# Patient Record
Sex: MALE | Race: WHITE | NOT HISPANIC OR LATINO | Employment: OTHER
[De-identification: names, ages, dates, MRNs, and addresses within clinical notes are randomized per-mention and may not be internally consistent; named-entity substitution may affect disease eponyms.]

---

## 2023-10-09 ENCOUNTER — TRANSCRIBE ORDERS (OUTPATIENT)
Dept: SCHEDULING | Age: 73
End: 2023-10-09

## 2023-10-09 DIAGNOSIS — M48.061 SPINAL STENOSIS, LUMBAR REGION WITHOUT NEUROGENIC CLAUDICATION: Primary | ICD-10-CM

## 2023-10-10 ENCOUNTER — HOSPITAL ENCOUNTER (OUTPATIENT)
Dept: RADIOLOGY | Facility: HOSPITAL | Age: 73
Discharge: HOME | End: 2023-10-10
Attending: PHYSICAL MEDICINE & REHABILITATION
Payer: MEDICARE

## 2023-10-10 DIAGNOSIS — M48.061 SPINAL STENOSIS, LUMBAR REGION WITHOUT NEUROGENIC CLAUDICATION: ICD-10-CM

## 2023-10-10 PROCEDURE — 72148 MRI LUMBAR SPINE W/O DYE: CPT

## 2023-10-11 ENCOUNTER — TELEPHONE (OUTPATIENT)
Dept: SURGERY | Facility: CLINIC | Age: 73
End: 2023-10-11
Payer: MEDICARE

## 2023-10-11 NOTE — TELEPHONE ENCOUNTER
"1. Who referred you to our office? Dr. Steve Ash    2.  Have you had spine surgery, including a spinal cord stimulator? No  No- Go to question 3.    Yes-  Did you have the surgery with Dr. Richardson?  Yes-   Go to question 3.    No- Because you've had prior surgery, the practice requires you to drop off, or mail, a recent (post-surgery) MRI or CT scan. As a courtesy, Dr. Richardson will review the imaging study to determine if he is able to offer surgical options. If Dr. Richardson does not feel there is a surgical solution he can offer, his nurse will call you with that information, and you won't need to come into the office. However, I am going to ask you a few more questions to get a better idea of how we can help you.  Go to question 3.    3. Is your pain related to a worker's compensation injury or a recent (within 2 years) auto accident?No  No- Go to question 4.    Yes- Unfortunately, Dr. Richardson does not accept these insurances  (No additional questions, please forward the encounter to [facility])         4.    Is your pain related to a spinal Fracture?No        No- Go to question 5.      Yes- Dr Richardson does not manage spinal fracture. We recommend following up with   Primary care provider or Pain management.        Where is your pain located (neck (cervical), mid (thoracic), or low back (lumbar))?Lumbar  If the patient has multiple areas of pain, explain I will make a note of this in your chart. However, Dr. Richardson will be able to focus on only ONE area at your appointment.   Go to question 5.    5. Have you completed an MRI or CT scan of your spine within the last 18 months? Yes    Yes- \"please bring the discs and reports of these images to your appointment.\"       Schedule with Dr. Richardson   Go to question 6.     No- \"I can schedule you an appointment with Dr. Dylan Joel's physician assistant.  She evaluates patients who do not have MRIs or CTs to help start a treatment plan. If " "after your initial visit with Marycruz, it is appropriate to see Dr. Richardson, we will schedule accordingly.\"      Schedule with Marycruz (make appointment length 60 minutes)   Go to question 7.     6. Have you completed x-rays of your spine within the last year?No    Yes- please bring the discs and reports of these images to your appointment.    Go to question 7.    No- We will order an x-ray for you, to complete prior to your visit. Are you able to complete the x-rays at a [facility] facility?     If patient cannot go to [facility], find out where they will be going so order can be faxed.    Remind patient to bring the disc of their images, and written report, to their appointment.   Schedule appointment 7-10 days out to give patient time to complete x-rays.   Go to question 7.    7.  Does your pain radiate? Yes  i. Go to question 8.       8. Do you have numbness or tingling? Yes    Go to question 9.    9. Do you have weakness?Yes  i. Go to question 10.     10. Have you completed physical therapy? Yes  i. Go to question 11.    11. Have you completed spinal injections? Yes      Please forward Telephone Encounter to [facility] Provider Pool.    Before disconnecting with the patient, inform them  Dr. Richardson cannot prescribe narcotic pain medication or perform epidural injections. But he can refer you to an injection specialist if that is determined to be the best treatment for you.    Instruct all patients to arrive 20 minutes prior to appointment for Check In.    If you do not schedule a patient for an appointment,   please give a reason for not scheduling!!    "

## 2023-10-13 ENCOUNTER — TELEPHONE (OUTPATIENT)
Dept: SURGERY | Facility: CLINIC | Age: 73
End: 2023-10-13
Payer: MEDICARE

## 2023-10-13 NOTE — TELEPHONE ENCOUNTER
Spoke with patient - he was under the impression that he needed to complete an x-ray prior to his appointment. Since he has an MRI, no xray is needed prior to his appointment. If Dr. Richardson wants him to get an x-ray after his appointment for some reason, he will discuss it at the patient's visit.  Moved patient's appt from 10/23 to 10/16 as patient is having severe leg pain.

## 2023-10-16 ENCOUNTER — OFFICE VISIT (OUTPATIENT)
Dept: SURGERY | Facility: CLINIC | Age: 73
End: 2023-10-16
Payer: MEDICARE

## 2023-10-16 VITALS
TEMPERATURE: 97.8 F | BODY MASS INDEX: 35.89 KG/M2 | OXYGEN SATURATION: 98 % | DIASTOLIC BLOOD PRESSURE: 90 MMHG | RESPIRATION RATE: 14 BRPM | HEART RATE: 91 BPM | WEIGHT: 265 LBS | HEIGHT: 72 IN | SYSTOLIC BLOOD PRESSURE: 152 MMHG

## 2023-10-16 DIAGNOSIS — M54.16 LUMBAR RADICULOPATHY: Primary | ICD-10-CM

## 2023-10-16 PROCEDURE — 99204 OFFICE O/P NEW MOD 45 MIN: CPT | Performed by: ORTHOPAEDIC SURGERY

## 2023-10-16 RX ORDER — SIMVASTATIN 10 MG/1
TABLET, FILM COATED ORAL
COMMUNITY
Start: 2023-10-01

## 2023-10-16 RX ORDER — LEVOTHYROXINE SODIUM 125 UG/1
1 TABLET ORAL DAILY
COMMUNITY
Start: 2000-01-01

## 2023-10-16 RX ORDER — TRAMADOL HYDROCHLORIDE 50 MG/1
TABLET ORAL
COMMUNITY
Start: 2023-10-11

## 2023-10-16 RX ORDER — METHYLPREDNISOLONE 4 MG/1
TABLET ORAL
Qty: 21 TABLET | Refills: 0 | Status: SHIPPED | OUTPATIENT
Start: 2023-10-16

## 2023-10-16 RX ORDER — TESTOSTERONE ENANTHATE 200 MG/ML
VIAL (ML) INTRAMUSCULAR
COMMUNITY
Start: 2014-01-01

## 2023-10-16 RX ORDER — SEMAGLUTIDE 1.34 MG/ML
INJECTION, SOLUTION SUBCUTANEOUS
COMMUNITY

## 2023-10-16 RX ORDER — CYCLOBENZAPRINE HCL 5 MG
1 TABLET ORAL 3 TIMES DAILY PRN
COMMUNITY

## 2023-10-16 RX ORDER — ARGININE HCL 500 MG
CAPSULE ORAL
COMMUNITY
Start: 2023-07-01

## 2023-10-16 RX ORDER — TADALAFIL 2.5 MG/1
5 TABLET ORAL DAILY
COMMUNITY

## 2023-10-16 RX ORDER — LIOTHYRONINE SODIUM 5 UG/1
5 TABLET ORAL
COMMUNITY

## 2023-10-16 RX ORDER — ANASTROZOLE 1 MG/1
TABLET ORAL
COMMUNITY
Start: 2023-08-03

## 2023-10-16 RX ORDER — TAMSULOSIN HYDROCHLORIDE 0.4 MG/1
CAPSULE ORAL
COMMUNITY
Start: 2023-08-22

## 2023-10-16 RX ORDER — SILDENAFIL 50 MG/1
50 TABLET, FILM COATED ORAL DAILY
COMMUNITY
Start: 2023-09-09

## 2023-10-16 RX ORDER — HYDROCODONE BITARTRATE AND ACETAMINOPHEN 5; 325 MG/1; MG/1
TABLET ORAL
COMMUNITY

## 2023-10-16 RX ORDER — GABAPENTIN 300 MG/1
CAPSULE ORAL
COMMUNITY

## 2023-10-16 RX ORDER — ERGOCALCIFEROL 1.25 MG/1
1 CAPSULE ORAL WEEKLY
COMMUNITY

## 2023-10-16 ASSESSMENT — PAIN SCALES - GENERAL: PAINLEVEL: 4

## 2023-10-16 NOTE — LETTER
2023     Steve Ash MD  [address]  [facility] [address]    Patient: Uche Oswald  YOB: 1950  Date of Visit: 10/16/2023      Dear Dr. Ash:    Thank you for referring Uche Oswald to me for evaluation. Below are my notes for this consultation.    If you have questions, please do not hesitate to call me. I look forward to following your patient along with you.         Sincerely,        Andres Richardson MD        CC: MD Dylan Cazares, Andres Banda MD  10/16/2023 12:10 PM  Sign when Signing Visit  NAME: Uche Oswald  : 1950  PCP: JU Abreu MD      Chief Complaint: right leg pain    HPI:  72 y.o. male presenting for initial visit with chief complaint of right leg pain.  Patient has a long history of chronic spinal problems.  He underwent a mild procedure which was successful.  He was doing quite well until approximately 10 days ago when he was on a fly fishing trip.  He then developed severe pain down the right lower extremity.  He has taken an oral steroid which has provided some relief.  His right leg is still painful but has been improving.  Denies any livan trauma. Denies fever or chills. Denies any bladder or bowel changes.      PAST MEDICAL HISTORY:   Past Medical History:   Diagnosis Date    Hyperthyroidism     Lipid disorder            SOCIAL HISTORY:  Social History     Socioeconomic History    Marital status:      Spouse name: Not on file    Number of children: Not on file    Years of education: Not on file    Highest education level: Not on file   Occupational History    Not on file   Tobacco Use    Smoking status: Never    Smokeless tobacco: Never   Substance and Sexual Activity    Alcohol use: Yes    Drug use: Never    Sexual activity: Yes   Other Topics Concern    Not on file   Social History Narrative    Not on file     Social Determinants of Health     Financial Resource  Strain: Not on file   Food Insecurity: Not on file   Transportation Needs: Not on file   Physical Activity: Not on file   Stress: Not on file   Social Connections: Not on file   Intimate Partner Violence: Not on file   Housing Stability: Not on file       ALLERGIES:  No Known Allergies    ROS:   Constitutional:  No fever, chills, night sweats, decreased appetite   HEENT No change in vision, no difficulty hearing, no sore throat, no difficulty swallowing   Cardiovascular:  No chest pain, palpitations, heart murmur   Respiratory:  No SOB, coughing, wheezes/rales/rhonchi, chest discomfort or tightness (angina)   Gastrointestinal:  No nausea, vomiting, abdominal pain, or liver problems    Genitourinary:  No dysuria or incontinence    Musculoskeletal:  See HPI   Skin:  No rash or erythema   Neurologic:  See HPI   Psychiatric Illness:  No confusion   Hematological/   Lymphatic:  No abnormal bleeding or swollen lymph nodes.    Allergic/Immunologic:  No hay fever and Lupus.      PHYSICAL EXAM:  Visit Vitals  BP (!) 152/90 (BP Location: Left upper arm, Patient Position: Sitting)   Pulse 91   Temp 36.6 °C (97.8 °F) (Temporal)   Resp 14   Ht 1.829 m (6')   Wt 120 kg (265 lb)   SpO2 98%   BMI 35.94 kg/m²         General:  Well-developed,appears well, no acute distress   HEENT Normocephalic. Sclera nonicteric. Negative for masses, asymmetry and tracheal deviation.    Respiratory:  No SOB, no abnormal effort (use of accessory muscles).    CV:  Pulses regular rate.  All extremities warm with brisk capillary refill.   Neurologic:  Alert and oriented to person, place and time.    GI / Abdominal:  Soft. No abdominal masses or tenderness. Nondistended.    Gait & balance No evidence of myelopathic gait.          Neurologic:    Lower Extremity Motor Function    Right  Left    Iliopsoas  5/5  5/5    Quadriceps 5/5 5/5   Tibialis anterior  5-/5  5/5    EHL  5-/5  5/5    Gastroc. muscle  5/5  5/5                Sensory: light touch is  intact to bilateral upper and lower extremities     IMAGING: I have personally reviewed the images and these are my findings:  MR Lumbar: MRI of the lumbar spine indicated multilevel degeneration.  There is chronic spinal stenosis from L1-L5.  In addition there was an acute right-sided L4-L5 extruded disc herniation with compression of the right L4 nerve root.    DIAGNOSES:   Multilevel lumbar spondylosis  Multilevel spinal stenosis  Superimposed right L4-L5 disc herniation with compression of the right L4 nerve root    ASSESSMENT/PLAN:  Mr. Oswald 72-year-old gentleman with chronic back issues.  He has been followed by Dr. Ash doing quite well up until 10 days ago.  He was on a trip flyfishing.  He developed severe right lower extremity pain.  His new symptoms are consistent with his MRI findings.  The MRI indicated a right-sided L4-L5 disc extrusion with compression of the L4 nerve root.  He has mild weakness of the right ankle but his pain has improved over the past week.  We discussed various treatment options including observation, an epidural injection or surgical intervention.  Given that he is already showing signs of improvement combined with the favorable natural history of disc herniations he would like to avoid surgery if possible.  He is going to follow-up with Dr. Ash for an epidural injection.  Anticipate that he will be able to resolve his radiculopathy with nonsurgical methods.  If not, he will follow-up for discussion of a lumbar decompression and discectomy.    The above dictation was performed using Dragon dictation software.  Please excuse any typos or grammatical errors. If there are any portions of this note that are unclear, please feel free to reach out to me directly.  My office number is [phone].

## 2023-10-16 NOTE — PROGRESS NOTES
NAME: Uche Oswald  : 1950  PCP: JU Abreu MD      Chief Complaint: right leg pain    HPI:  72 y.o. male presenting for initial visit with chief complaint of right leg pain.  Patient has a long history of chronic spinal problems.  He underwent a mild procedure which was successful.  He was doing quite well until approximately 10 days ago when he was on a fly fishing trip.  He then developed severe pain down the right lower extremity.  He has taken an oral steroid which has provided some relief.  His right leg is still painful but has been improving.  Denies any livan trauma. Denies fever or chills. Denies any bladder or bowel changes.      PAST MEDICAL HISTORY:   Past Medical History:   Diagnosis Date    Hyperthyroidism     Lipid disorder            SOCIAL HISTORY:  Social History     Socioeconomic History    Marital status:      Spouse name: Not on file    Number of children: Not on file    Years of education: Not on file    Highest education level: Not on file   Occupational History    Not on file   Tobacco Use    Smoking status: Never    Smokeless tobacco: Never   Substance and Sexual Activity    Alcohol use: Yes    Drug use: Never    Sexual activity: Yes   Other Topics Concern    Not on file   Social History Narrative    Not on file     Social Determinants of Health     Financial Resource Strain: Not on file   Food Insecurity: Not on file   Transportation Needs: Not on file   Physical Activity: Not on file   Stress: Not on file   Social Connections: Not on file   Intimate Partner Violence: Not on file   Housing Stability: Not on file       ALLERGIES:  No Known Allergies    ROS:   Constitutional:  No fever, chills, night sweats, decreased appetite   HEENT No change in vision, no difficulty hearing, no sore throat, no difficulty swallowing   Cardiovascular:  No chest pain, palpitations, heart murmur   Respiratory:  No SOB, coughing, wheezes/rales/rhonchi, chest  discomfort or tightness (angina)   Gastrointestinal:  No nausea, vomiting, abdominal pain, or liver problems    Genitourinary:  No dysuria or incontinence    Musculoskeletal:  See HPI   Skin:  No rash or erythema   Neurologic:  See HPI   Psychiatric Illness:  No confusion   Hematological/   Lymphatic:  No abnormal bleeding or swollen lymph nodes.    Allergic/Immunologic:  No hay fever and Lupus.      PHYSICAL EXAM:  Visit Vitals  BP (!) 152/90 (BP Location: Left upper arm, Patient Position: Sitting)   Pulse 91   Temp 36.6 °C (97.8 °F) (Temporal)   Resp 14   Ht 1.829 m (6')   Wt 120 kg (265 lb)   SpO2 98%   BMI 35.94 kg/m²         General:  Well-developed,appears well, no acute distress   HEENT Normocephalic. Sclera nonicteric. Negative for masses, asymmetry and tracheal deviation.    Respiratory:  No SOB, no abnormal effort (use of accessory muscles).    CV:  Pulses regular rate.  All extremities warm with brisk capillary refill.   Neurologic:  Alert and oriented to person, place and time.    GI / Abdominal:  Soft. No abdominal masses or tenderness. Nondistended.    Gait & balance No evidence of myelopathic gait.          Neurologic:    Lower Extremity Motor Function    Right  Left    Iliopsoas  5/5  5/5    Quadriceps 5/5 5/5   Tibialis anterior  5-/5  5/5    EHL  5-/5  5/5    Gastroc. muscle  5/5  5/5                Sensory: light touch is intact to bilateral upper and lower extremities     IMAGING: I have personally reviewed the images and these are my findings:  MR Lumbar: MRI of the lumbar spine indicated multilevel degeneration.  There is chronic spinal stenosis from L1-L5.  In addition there was an acute right-sided L4-L5 extruded disc herniation with compression of the right L4 nerve root.    DIAGNOSES:   Multilevel lumbar spondylosis  Multilevel spinal stenosis  Superimposed right L4-L5 disc herniation with compression of the right L4 nerve root    ASSESSMENT/PLAN:  Mr. Oswald 72-year-old gentleman with  chronic back issues.  He has been followed by Dr. Ash doing quite well up until 10 days ago.  He was on a trip flyfishing.  He developed severe right lower extremity pain.  His new symptoms are consistent with his MRI findings.  The MRI indicated a right-sided L4-L5 disc extrusion with compression of the L4 nerve root.  He has mild weakness of the right ankle but his pain has improved over the past week.  We discussed various treatment options including observation, an epidural injection or surgical intervention.  Given that he is already showing signs of improvement combined with the favorable natural history of disc herniations he would like to avoid surgery if possible.  He is going to follow-up with Dr. Ash for an epidural injection.  Anticipate that he will be able to resolve his radiculopathy with nonsurgical methods.  If not, he will follow-up for discussion of a lumbar decompression and discectomy.    The above dictation was performed using Dragon dictation software.  Please excuse any typos or grammatical errors. If there are any portions of this note that are unclear, please feel free to reach out to me directly.  My office number is [phone].

## 2023-10-26 ENCOUNTER — OFFICE VISIT (OUTPATIENT)
Dept: UROLOGY | Facility: MEDICAL CENTER | Age: 73
End: 2023-10-26
Payer: MEDICARE

## 2023-10-26 VITALS
OXYGEN SATURATION: 97 % | SYSTOLIC BLOOD PRESSURE: 148 MMHG | HEIGHT: 72 IN | BODY MASS INDEX: 36.84 KG/M2 | HEART RATE: 90 BPM | WEIGHT: 272 LBS | DIASTOLIC BLOOD PRESSURE: 82 MMHG

## 2023-10-26 DIAGNOSIS — N13.8 BPH WITH OBSTRUCTION/LOWER URINARY TRACT SYMPTOMS: Primary | ICD-10-CM

## 2023-10-26 DIAGNOSIS — Z12.5 PROSTATE CANCER SCREENING: ICD-10-CM

## 2023-10-26 DIAGNOSIS — N40.1 BPH WITH OBSTRUCTION/LOWER URINARY TRACT SYMPTOMS: Primary | ICD-10-CM

## 2023-10-26 DIAGNOSIS — N52.02 CORPORO-VENOUS OCCLUSIVE ERECTILE DYSFUNCTION: ICD-10-CM

## 2023-10-26 DIAGNOSIS — E29.1 HYPOGONADISM IN MALE: ICD-10-CM

## 2023-10-26 DIAGNOSIS — R97.20 INCREASED PROSTATE SPECIFIC ANTIGEN (PSA) VELOCITY: ICD-10-CM

## 2023-10-26 PROCEDURE — 99204 OFFICE O/P NEW MOD 45 MIN: CPT | Performed by: UROLOGY

## 2023-10-26 RX ORDER — ANASTROZOLE 1 MG/1
TABLET ORAL EVERY 24 HOURS
COMMUNITY
Start: 2023-08-03

## 2023-10-26 RX ORDER — LEVOTHYROXINE SODIUM 0.12 MG/1
125 TABLET ORAL DAILY
COMMUNITY

## 2023-10-26 RX ORDER — OMEGA-3 FATTY ACIDS/FISH OIL 300-1000MG
200 CAPSULE ORAL AS NEEDED
COMMUNITY

## 2023-10-26 RX ORDER — SIMVASTATIN 10 MG
10 TABLET ORAL
COMMUNITY
Start: 2023-10-01

## 2023-10-26 RX ORDER — ARGININE 500 MG
TABLET ORAL
COMMUNITY
Start: 2023-07-01

## 2023-10-26 RX ORDER — ERGOCALCIFEROL 1.25 MG/1
50000 CAPSULE ORAL
COMMUNITY

## 2023-10-26 RX ORDER — CYCLOBENZAPRINE HCL 5 MG
1 TABLET ORAL 3 TIMES DAILY PRN
COMMUNITY

## 2023-10-26 RX ORDER — SEMAGLUTIDE 2.68 MG/ML
2 INJECTION, SOLUTION SUBCUTANEOUS
COMMUNITY
Start: 2023-01-01

## 2023-10-26 RX ORDER — VALACYCLOVIR HYDROCHLORIDE 1 G/1
TABLET, FILM COATED ORAL
COMMUNITY
Start: 2023-10-17

## 2023-10-26 RX ORDER — TADALAFIL 5 MG/1
5 TABLET ORAL DAILY PRN
COMMUNITY
Start: 2023-07-01

## 2023-10-26 NOTE — LETTER
October 26, 2023     Hoa Fallon MD  [address] [facility]    Patient: Therese Boswell   YOB: 1950   Date of Visit: 10/26/2023       Dear Dr. Uziel Carias:    Thank you for referring Therese Boswell to me for evaluation. Below are my notes for this consultation. If you have questions, please do not hesitate to call me. I look forward to following your patient along with you. Sincerely,        Brent Toledo MD        CC: No Recipients    Brent Toledo MD  10/26/2023  2:36 PM  Sign when Signing Visit  Assessment/Plan:  #1. BPH with lower urinary tract obstructive symptoms-status post TURP with most complaints now being urgency and frequency. Plan cystoscopy check PVR and eventual initiation of anticholinergics or beta 3 agonist.    2.  Increased PSA velocity with total PSA still being within normal limits. We will repeat PSA with free fraction again in 3 months. If PSA continues to go up we will consider multiparametric MRI. 3.  Male hypogonadism-the patient will continue on testosterone 200 mg/cc injecting 0.35 cc i.e. 70 mg weekly. Testosterone level right before the next injection to check yudelka. 4.  Erectile dysfunction-failure to respond to 5 mg of daily Cialis. We will try 20 mg of Cialis on demand and if that does not work fill alprostadil prescription and return for test injection. 5.  Prostate cancer screening-as above. No problem-specific Assessment & Plan notes found for this encounter. Diagnoses and all orders for this visit:    BPH with obstruction/lower urinary tract symptoms  -     PSA, total and free; Future  -     Cystoscopy; Future  -     POCT Measure PVR;  Future    Increased prostate specific antigen (PSA) velocity    Hypogonadism in male  -     Testosterone, free, total; Future    Corporo-venous occlusive erectile dysfunction  -     Alprostadil, Vasodilator, (PROSTAGLANDIN PGE1 INJECTABLE 20 MCG/ML, STANDARD, IJ SOLN); 1 mL by Intracavernosal route once for 1 dose    Prostate cancer screening  -     PSA, total and free; Future    Other orders  -     TESTOSTERONE IM; Inject 0.3 mL into a muscle  -     valACYclovir (VALTREX) 1,000 mg tablet; PT STATED PRN  -     Ibuprofen 200 MG CAPS; Take 200 mg by mouth if needed  -     cyclobenzaprine (FLEXERIL) 5 mg tablet; Take 1 tablet by mouth Three times daily as needed for muscle spasms (Patient not taking: Reported on 10/26/2023)  -     ergocalciferol (ERGOCALCIFEROL) 1.25 MG (27874 UT) capsule; Take 50,000 Units by mouth every 7 days  -     levothyroxine 125 mcg tablet; Take 125 mcg by mouth daily  -     NETTLE-PYGEUM AFRICANUM PO; Take 200 mg by mouth in the morning  -     Ozempic, 2 MG/DOSE, 8 MG/3ML injection pen; Inject 2 mg under the skin every 7 days  -     simvastatin (ZOCOR) 10 mg tablet; Take 10 mg by mouth daily at bedtime (Patient not taking: Reported on 10/26/2023)  -     tadalafil (CIALIS) 5 MG tablet; Take 5 mg by mouth daily as needed  -     Acetaminophen (TYLENOL 8 HOUR PO); 4 (four) times a day as needed  -     anastrozole (ARIMIDEX) 1 mg tablet; every 24 hours  -     Arginine (L-Arginine-500) 500 MG CAPS          Subjective:      Patient ID: Caroline Garsia is a 71 y.o. male. HPI  44-year-old male with male hypogonadism treated by another physician on IM testosterone presently injecting 70 mg twice weekly with a testosterone level that is slightly above normal.  The patient also notes erectile dysfunction failing to respond to PDE 5 inhibitors i.e. Cialis 5 mg p.o. daily. Presents with urgency and frequency despite TURP-see AUA symptom score. The patient actually complains mostly of urgency and frequency of urination.   The following portions of the patient's history were reviewed and updated as appropriate: allergies, current medications, past family history, past medical history, past social history, past surgical history, and problem list.    Review of Systems Genitourinary:         See AUA symptom score, status post TURP with most complaints being urgency and frequency with nocturia   All other systems reviewed and are negative. Objective:      /82 (BP Location: Left arm, Patient Position: Sitting, Cuff Size: Large)   Pulse 90   Ht 6' (1.829 m)   Wt 123 kg (272 lb)   SpO2 97%   BMI 36.89 kg/m²          Physical Exam  Vitals reviewed. Constitutional:       General: He is not in acute distress. Appearance: Normal appearance. He is obese. He is not ill-appearing, toxic-appearing or diaphoretic. HENT:      Head: Normocephalic and atraumatic. Nose: Nose normal.      Mouth/Throat:      Mouth: Mucous membranes are moist.   Eyes:      Extraocular Movements: Extraocular movements intact. Pulmonary:      Effort: Pulmonary effort is normal. No respiratory distress. Breath sounds: No wheezing, rhonchi or rales. Abdominal:      General: There is no distension. Palpations: Abdomen is soft. Tenderness: There is no abdominal tenderness. There is no guarding or rebound. Genitourinary:     Penis: Normal.       Testes: Normal.      Rectum: Normal.      Comments: JADYN normal anal verge normal anal sphincter tone no palpable rectal masses. Prostate approximately 40 g a nodular nontender  Musculoskeletal:         General: Normal range of motion. Cervical back: Neck supple. Skin:     General: Skin is warm. Neurological:      Mental Status: He is alert and oriented to person, place, and time. Psychiatric:         Mood and Affect: Mood normal.         Behavior: Behavior normal.         Thought Content:  Thought content normal.         Judgment: Judgment normal.

## 2023-10-26 NOTE — PROGRESS NOTES
Assessment/Plan:  #1. BPH with lower urinary tract obstructive symptoms-status post TURP with most complaints now being urgency and frequency. Plan cystoscopy check PVR and eventual initiation of anticholinergics or beta 3 agonist.    2.  Increased PSA velocity with total PSA still being within normal limits. We will repeat PSA with free fraction again in 3 months. If PSA continues to go up we will consider multiparametric MRI. 3.  Male hypogonadism-the patient will continue on testosterone 200 mg/cc injecting 0.35 cc i.e. 70 mg weekly. Testosterone level right before the next injection to check yudelka. 4.  Erectile dysfunction-failure to respond to 5 mg of daily Cialis. We will try 20 mg of Cialis on demand and if that does not work fill alprostadil prescription and return for test injection. 5.  Prostate cancer screening-as above. No problem-specific Assessment & Plan notes found for this encounter. Diagnoses and all orders for this visit:    BPH with obstruction/lower urinary tract symptoms  -     PSA, total and free; Future  -     Cystoscopy; Future  -     POCT Measure PVR; Future    Increased prostate specific antigen (PSA) velocity    Hypogonadism in male  -     Testosterone, free, total; Future    Corporo-venous occlusive erectile dysfunction  -     Alprostadil, Vasodilator, (PROSTAGLANDIN PGE1 INJECTABLE 20 MCG/ML, STANDARD, IJ SOLN); 1 mL by Intracavernosal route once for 1 dose    Prostate cancer screening  -     PSA, total and free; Future    Other orders  -     TESTOSTERONE IM; Inject 0.3 mL into a muscle  -     valACYclovir (VALTREX) 1,000 mg tablet; PT STATED PRN  -     Ibuprofen 200 MG CAPS; Take 200 mg by mouth if needed  -     cyclobenzaprine (FLEXERIL) 5 mg tablet; Take 1 tablet by mouth Three times daily as needed for muscle spasms (Patient not taking: Reported on 10/26/2023)  -     ergocalciferol (ERGOCALCIFEROL) 1.25 MG (84223 UT) capsule;  Take 50,000 Units by mouth every 7 days  -     levothyroxine 125 mcg tablet; Take 125 mcg by mouth daily  -     NETTLE-PYGEUM AFRICANUM PO; Take 200 mg by mouth in the morning  -     Ozempic, 2 MG/DOSE, 8 MG/3ML injection pen; Inject 2 mg under the skin every 7 days  -     simvastatin (ZOCOR) 10 mg tablet; Take 10 mg by mouth daily at bedtime (Patient not taking: Reported on 10/26/2023)  -     tadalafil (CIALIS) 5 MG tablet; Take 5 mg by mouth daily as needed  -     Acetaminophen (TYLENOL 8 HOUR PO); 4 (four) times a day as needed  -     anastrozole (ARIMIDEX) 1 mg tablet; every 24 hours  -     Arginine (L-Arginine-500) 500 MG CAPS          Subjective:      Patient ID: Tomer Or is a 71 y.o. male. HPI  28-year-old male with male hypogonadism treated by another physician on IM testosterone presently injecting 70 mg twice weekly with a testosterone level that is slightly above normal.  The patient also notes erectile dysfunction failing to respond to PDE 5 inhibitors i.e. Cialis 5 mg p.o. daily. Presents with urgency and frequency despite TURP-see AUA symptom score. The patient actually complains mostly of urgency and frequency of urination. The following portions of the patient's history were reviewed and updated as appropriate: allergies, current medications, past family history, past medical history, past social history, past surgical history, and problem list.    Review of Systems   Genitourinary:         See AUA symptom score, status post TURP with most complaints being urgency and frequency with nocturia   All other systems reviewed and are negative. Objective:      /82 (BP Location: Left arm, Patient Position: Sitting, Cuff Size: Large)   Pulse 90   Ht 6' (1.829 m)   Wt 123 kg (272 lb)   SpO2 97%   BMI 36.89 kg/m²          Physical Exam  Vitals reviewed. Constitutional:       General: He is not in acute distress. Appearance: Normal appearance. He is obese. He is not ill-appearing, toxic-appearing or diaphoretic. HENT:      Head: Normocephalic and atraumatic. Nose: Nose normal.      Mouth/Throat:      Mouth: Mucous membranes are moist.   Eyes:      Extraocular Movements: Extraocular movements intact. Pulmonary:      Effort: Pulmonary effort is normal. No respiratory distress. Breath sounds: No wheezing, rhonchi or rales. Abdominal:      General: There is no distension. Palpations: Abdomen is soft. Tenderness: There is no abdominal tenderness. There is no guarding or rebound. Genitourinary:     Penis: Normal.       Testes: Normal.      Rectum: Normal.      Comments: JADYN normal anal verge normal anal sphincter tone no palpable rectal masses. Prostate approximately 40 g a nodular nontender  Musculoskeletal:         General: Normal range of motion. Cervical back: Neck supple. Skin:     General: Skin is warm. Neurological:      Mental Status: He is alert and oriented to person, place, and time. Psychiatric:         Mood and Affect: Mood normal.         Behavior: Behavior normal.         Thought Content:  Thought content normal.         Judgment: Judgment normal.

## 2023-10-30 ENCOUNTER — TELEPHONE (OUTPATIENT)
Age: 73
End: 2023-10-30

## 2023-10-30 NOTE — TELEPHONE ENCOUNTER
Pt saw Dr. Rufina Salazar 10/26/23. Dr Rufina Salazar mentioned picking up a shot at the pharmacy before the next visit. Dr. Rufina Salazar told PT to go to a particular pharmacy. PT unsure the name of the pharmacy or when to  the shot.     Pt req  [phone]

## 2023-10-30 NOTE — TELEPHONE ENCOUNTER
Spoke to pt and advised that per Dr. Sabino Combs note from 10/26/23 pt is to trial Cialis 20 mgs on demand before filling for alprostadil. He stated he will do so and call office back with results. Pt is scheduled for cysto for BPH w/lower urinary tract obstructive symptoms on 1/18/24. He will be advised when to fill Alprostadil RX per Dr. Coni Person based on response to Cialis 20 mgs.   If pt does have RX filled he will need to be scheduled for an appt for injection

## 2024-01-02 ENCOUNTER — APPOINTMENT (OUTPATIENT)
Dept: LAB | Facility: CLINIC | Age: 74
End: 2024-01-02
Payer: MEDICARE

## 2024-01-02 DIAGNOSIS — E29.1 HYPOGONADISM IN MALE: ICD-10-CM

## 2024-01-02 DIAGNOSIS — N13.8 BPH WITH OBSTRUCTION/LOWER URINARY TRACT SYMPTOMS: ICD-10-CM

## 2024-01-02 DIAGNOSIS — N40.1 BPH WITH OBSTRUCTION/LOWER URINARY TRACT SYMPTOMS: ICD-10-CM

## 2024-01-02 DIAGNOSIS — Z12.5 PROSTATE CANCER SCREENING: ICD-10-CM

## 2024-01-02 PROCEDURE — 84403 ASSAY OF TOTAL TESTOSTERONE: CPT

## 2024-01-02 PROCEDURE — 36415 COLL VENOUS BLD VENIPUNCTURE: CPT

## 2024-01-02 PROCEDURE — 84402 ASSAY OF FREE TESTOSTERONE: CPT

## 2024-01-02 PROCEDURE — 84154 ASSAY OF PSA FREE: CPT

## 2024-01-02 PROCEDURE — 84153 ASSAY OF PSA TOTAL: CPT

## 2024-01-03 LAB
PSA FREE MFR SERPL: 22.5 %
PSA FREE SERPL-MCNC: 0.45 NG/ML
PSA SERPL-MCNC: 2 NG/ML (ref 0–4)

## 2024-01-04 LAB
TESTOST FREE SERPL-MCNC: 6.1 PG/ML (ref 6.6–18.1)
TESTOST SERPL-MCNC: 591 NG/DL (ref 264–916)

## 2024-09-09 ENCOUNTER — OFFICE VISIT (OUTPATIENT)
Dept: SURGERY | Facility: CLINIC | Age: 74
End: 2024-09-09
Payer: MEDICARE

## 2024-09-09 VITALS — OXYGEN SATURATION: 98 % | SYSTOLIC BLOOD PRESSURE: 119 MMHG | HEART RATE: 65 BPM | DIASTOLIC BLOOD PRESSURE: 77 MMHG

## 2024-09-09 DIAGNOSIS — M48.062 SPINAL STENOSIS OF LUMBAR REGION WITH NEUROGENIC CLAUDICATION: Primary | ICD-10-CM

## 2024-09-09 PROCEDURE — 99214 OFFICE O/P EST MOD 30 MIN: CPT | Performed by: ORTHOPAEDIC SURGERY

## 2024-09-09 RX ORDER — IBUPROFEN 400 MG/1
400 TABLET ORAL EVERY 4 HOURS
COMMUNITY

## 2024-09-09 RX ORDER — ACETAMINOPHEN 500 MG
1000 TABLET ORAL EVERY 6 HOURS PRN
COMMUNITY

## 2024-09-09 RX ORDER — BLOOD-GLUCOSE SENSOR
EACH MISCELLANEOUS
COMMUNITY
Start: 2024-08-23

## 2024-09-09 NOTE — PROGRESS NOTES
Uche Oswald presents today for follow up.  He reported that he largely resolved his prior right L4 radiculopathy with an epidural injeciton.  The residual pain had disappeared after undergoing an unrelated kidney resection for renal cancer.  He then subsequently underwent placement of a interspinous spacer at the L4-L5 level.  This caused his right lower extremity pain to return.  In addition, he recently fell while playing ping-pong onto pavers.  He has significant pain from this as well.      Neurologic Exam  No gross neurologic deficits    Imaging: MRI 8/2024: MRI indicated spinal stenosis from L1-L5.  There is a right-sided disc herniation at L4-L5.  There is placement of an interspinous spacer at L4-L5.    Diagnoses:   Right lower leg neurogenic pain  Neurogenic claudication  Acute low back pain following a fall    Assessment/Plan:  Mr. Oswald is a 73-year-old male with a longstanding history of spinal stenosis.  He reported that he was doing quite well after prior epidural injections as well as undergoing a kidney resection.  However, after undergoing an interspinous spacer he developed a recurrence of right lower extremity pain and he continues to have symptoms of neurogenic claudication.  He also recently fell onto his buttocks and back.  I prescribed an MRI of his lumbar spine to ensure that there are no fractures or new areas of spinal pathology due to his fall.  I informed him that he would be very unlikely for him to have sustained any injuries that would require surgery.  I anticipate he will benefit from further pain management that would include epidural injections.  Cosmo that it could be reasonable to consider a lumbar laminectomy but this would be rather involved and may involve an L1-L5 decompression.  He would like to keep that as an absolute last resort.  All additional questions were answered.  We will see how he does after his MRI.        The above dictation was performed using  Dragon dictation software.  Please excuse any typos or grammatical errors. If there are any portions of this note that are unclear, please feel free to reach out to me directly.  My office number is [phone].

## 2024-10-30 ENCOUNTER — TELEPHONE (OUTPATIENT)
Dept: NEUROLOGY | Facility: CLINIC | Age: 74
End: 2024-10-30
Payer: MEDICARE

## 2024-10-30 NOTE — TELEPHONE ENCOUNTER
Patient scheduled for appt with Neurosurgery.  All imaging completed outside of [facility].   Patient instructed to bring CD with them to appt.   No imaging to review with Neurosurgery team at this time.

## 2024-10-30 NOTE — TELEPHONE ENCOUNTER
Name: Uche Oswald   Phone: [phone]  Level/Region: lumbar  Referring: Dr Ash  Has another surgeon recommended surgery? No  MRI month/year: 10/2024   MRI location: [facility]  Other workup/treatment (surgery/EMG/injections): Yes, describe: Epidural    The patient was offered the next available appointment.  I informed him that we would collect outside records to facilitate his new patient appointment.

## 2024-11-04 NOTE — TELEPHONE ENCOUNTER
Patient is calling into  the voice mail rollover line requesting  for a sooner appointment due to pain, patient is  scheduled for first available 12/16/2024 and on cancellation list patient is hoping to be seen sooner?

## 2024-11-07 NOTE — TELEPHONE ENCOUNTER
I returned call to the patient.  He reports long history of pain extending down the left lower extremity.  He has undergone traditional modalities of care including medications, epidural steroid injection and spacer.  He had recent MRI at [facility] in August 2024 which disclosed severe L2-3 stenosis with disc herniation resulting in severe foraminal stenosis on the left side.  Patient was offered a sooner appointment for tomorrow 11/8/2024 at 8 AM but patient lives an hour [place] a half away and it would be hard for him to make that appointment.  Will instead schedule him for 1:40 PM.  He was extremely happy with this change and looks forward to the appointment.

## 2024-11-08 ENCOUNTER — OFFICE VISIT (OUTPATIENT)
Dept: NEUROSURGERY | Facility: CLINIC | Age: 74
End: 2024-11-08
Payer: MEDICARE

## 2024-11-08 VITALS
DIASTOLIC BLOOD PRESSURE: 78 MMHG | SYSTOLIC BLOOD PRESSURE: 136 MMHG | OXYGEN SATURATION: 97 % | HEART RATE: 79 BPM | RESPIRATION RATE: 20 BRPM

## 2024-11-08 DIAGNOSIS — M47.816 LUMBAR SPONDYLOSIS: Primary | ICD-10-CM

## 2024-11-08 PROCEDURE — 99205 OFFICE O/P NEW HI 60 MIN: CPT | Performed by: NEUROLOGICAL SURGERY

## 2024-11-08 NOTE — PROGRESS NOTES
[facility]  [facility]  [address], [address]  [place] [address]  Phone: [phone]  Fax: [phone]        24      Re: Uche Oswald  : 1950      Chief Complaint:  Lower extremity radiculopathy     History of Present Illness:   Uche Oswald is a 73 y.o. right handed male who presents in neurosurgical consultation.  He has a longstanding history of lumbosacral, lower extremity discomfort.  There is no specific inciting event or circumstance.  He has been following with Dr. Sherwin Ash of pain management for the past 2 years.  In 2023 he noted new features of right lower extremity pain.  He underwent an epidural steroid injection which helped significantly reduce his discomfort.  His symptoms fluctuated thereafter and responded symptomatically to additional treatments.  Unfortunately he was diagnosed with a left-sided renal mass and underwent nephrectomy.  After this procedure his pain resolved.  He had a Minuteman procedure planned prior to his oncologic diagnosis.  He proceeded with this intervention after his nephrectomy.  His symptoms of right lower extremity pain returned thereafter.    While playing table tennis he suffered a significant fall landing on his bottom.  He noted greater aggravation of pain extending from his lumbosacral region into the gluteal, right posterolateral thigh, calf, foot.  Imaging studies demonstrated concern for a right-sided L4-L5 foraminal disc herniation as well as chronic high-grade central, lateral recess stenosis at L1-L2, L2-L3, L3-L4.  He consulted with Dr. Richardson of orthopedic surgery who recommended a multilevel laminectomy procedure.  He now presents in secondary consultation regarding surgical intervention.    On interview today, he describes significant pain extending from the gluteal region, thigh, shin, foot.  His pain vacillates in severity between a 2-10 out of  10.  Postural activities magnifies symptoms, whereas rest in the recumbent position helps to a degree.  He denies additional features of livan numbness, weakness, bowel, bladder dysfunction.  He requires a cane to assist with ambulation.  He reports a severe level of impairment in his activities of daily living secondary to the aforementioned issues.    Medical History:  has a past medical history of Hyperthyroidism and Lipid disorder.    Surgical History:  has a past surgical history that includes Lumbar fusion and Tonsillectomy.    Family History: family history includes Kidney cancer in his biological father.  Family history non-contributory to neurological condition.    Social History:   Social History     Socioeconomic History    Marital status:    Tobacco Use    Smoking status: Never    Smokeless tobacco: Never   Substance and Sexual Activity    Alcohol use: Yes    Drug use: Never    Sexual activity: Yes        Allergies:   Allergies   Allergen Reactions    Statins-Hmg-Coa Reductase Inhibitors Other (see comments)     Headaches and muscle aches        Medications:   Current Outpatient Medications   Medication Sig Dispense Refill    acetaminophen (TYLENOL) 500 mg tablet Take 1,000 mg by mouth every 6 (six) hours as needed for pain.      anastrozole (ARIMIDEX) 1 mg tablet TAKE 1/2 TABLET BY MOUTH EVERY SUNDAY.      arginine HCl, L-arginine, 500 mg capsule       cyclobenzaprine (FLEXERIL) 5 mg tablet Take 1 tablet by mouth 3 (three) times a day as needed.      ergocalciferol (VITAMIN D2) 50,000 unit(1250 mcg) capsule Take 1 capsule by mouth once a week.      "[employer] RENNY 3 SENSOR device device APPLY NEW SENSOR EVERY 14 DAYS      gabapentin (NEURONTIN) 300 mg capsule TAKE ONE CAPSULE BY MOUTH AT BEDTIME, AFTER 1 WEEK INCREASE TO TAKING ONE CAPSULE BY MOUTH EVERY MORNING AND EVERY EVENING      HYDROcodone-acetaminophen (NORCO) 5-325 mg per tablet TAKE ONE TO TWO TABLETS BY MOUTH THREE TIMES DAILY AS  NEEDED.      ibuprofen (MOTRIN) 400 mg tablet Take 400 mg by mouth every 4 (four) hours.      levothyroxine (SYNTHROID) 125 mcg tablet Take 1 tablet by mouth daily.      liothyronine (CYTOMEL) 5 mcg tablet Take 5 mcg by mouth.      methylPREDNISolone (MEDROL DOSEPACK) 4 mg tablet Follow package directions. 21 tablet 0    semaglutide (OZEMPIC) 0.25 mg or 0.5 mg(2 mg/1.5 mL) subcutaneous injection       sildenafiL (VIAGRA) 50 mg tablet Take 50 mg by mouth daily.      simvastatin (ZOCOR) 10 mg tablet       tadalafiL (CIALIS) 2.5 mg tablet Take 5 mg by mouth daily.      tamsulosin (FLOMAX) 0.4 mg capsule       testosterone enanthate (DELATESTRYL) 200 mg/mL injection 1 mL Intramuscular      traMADoL (ULTRAM) 50 mg tablet TAKE ONE TABLET BY MOUTH EVERY 4 TO 6 HOURS AS NEEDED FOR 3 DAYS       No current facility-administered medications for this visit.       Review of Systems:   A 14 point review of systems was performed and aside from what is mentioned above is otherwise negative.    General physical examination:  The patient is well appearing male, sitting upright in his chair in no acute distress, he appears his stated age.  His head is atraumatic, normocephalic. His neck is supple without obvious adenopathy. Oral mucosa is moist. His peripheral pulses are symmetric and palpable. Extremities without peripheral edema. His breathing is normal and unlabored.     Vitals:    11/08/24 1336   BP: 136/78   BP Location: Right upper arm   Patient Position: Sitting   Pulse: 79   Resp: 20   SpO2: 97%          Neurologic examination:  Mental status:  The patient is alert, attentive, and oriented. Speech is clear and fluent with good repetition, comprehension, and naming.  Remote and recent memory are normal as well as fund of knowledge.    Cranial nerves:  CN II: Visual fields are full to confrontation.  Pupils are equal and briskly reactive to light.   CN III, IV, VI: Extra-occular muscles are intact  CN V: Facial sensation is  intact and equal in V1-V3 distributions bilaterally.   CN VII: Face is symmetric with normal eye closure and smile.  CN VIII: Hearing is normal to rubbing fingers  CN IX, X: Palate elevates symmetrically. Phonation is normal.  CN XI: Head turning and shoulder shrug are intact  CN XII: Tongue is midline with normal movements and no atrophy.    Motor:  There is no pronator drift.  Muscle bulk and tone are normal.    Deltoid Biceps Triceps Wrist ext Hand  Finger Spread Hip flexion Knee ext Dorsi-  flexion EHL Plantar Flexion   L 5 5 5 5 5 5 4+ PL 5 5 5 5   R 5 5 5 5 5 5 4 PL 5 4 4 5     Reflexes:  Reflexes are 2+ and symmetric at the biceps, brachialis, triceps, patellar, and Achilli's. There is no Marc's sign or ankle clonus.    Sensory:   Intact light touch, pinprick, and position sense, throughout all 4 extremities.    Coordination:  There is no dysmetria on finger-to-nose testing.  Romberg is absent.    Gait:  Wider based gait but steady. Heel and toe walking are unsteady. Tandem gait is unsteady.      Data Review:  MRI LUMBAR SPINE WITHOUT CONTRAST 9/11/24      MRI LUMBAR SPINE WITHOUT CONTRAST 8/19/24        MRI LUMBAR SPINE WITHOUT CONTRAST 10/10/23    Narrative & Impression   CLINICAL HISTORY: Spinal stenosis, lumbar region without neurogenic claudication  [M48.061 (ICD-10-CM)]     COMMENT: MRI examination of the lumbar spine was performed without intravenous  contrast following the [facility] standard protocol.     COMPARISON: None.     Lumbosacral alignment: Normal lordosis. Mild retrolisthesis of L2 on L3 on the  basis of facet arthropathy.  Vertebral bodies: Normal in height, noting endplate irregularity and small  Schmorl's nodes at multiple levels. Focal hemangioma within L2.  Intervertebral discs: Multilevel loss of intervertebral disc height and  signal, appearing moderate to severe at L2-L3 and L5-S1 and moderate at L1-L2,  L3-L4 and L4-L5.     Lumbosacral central spinal canal: Multilevel  acquired compromise superimposed  on diffuse congenital narrowing.  Conus: Normal in position, caliber, and signal. See impression.     Axial images:  T12-L1: Facet hypertrophy. No central or neural foraminal stenosis.  L1-2: Disc osteophyte complex, facet hypertrophy and infolded ligamentum  flavum. Mild bilateral neural foraminal stenosis. Mild central and left greater  than right subarticular recess stenosis.  L2-3: Disc osteophyte complex with a superimposed left subarticular disc  protrusion, facet hypertrophy and infolded ligamentum flavum. Mild bilateral  neural foraminal stenosis. Mild central and left greater than right subarticular  recess stenosis.  L3-4: Disc osteophyte complex and bilateral facet hypertrophy. Moderate  bilateral neural foraminal stenosis. Mild central and bilateral subarticular  recess stenosis.  L4-5: Disc osteophyte complex eccentric dorsolateral to the right with a  superimposed right subarticular to foraminal foraminal disc herniation of the  extrusion type. Bilateral facet hypertrophy and infolded ligamentum flavum.  Severe right and moderate left neural foraminal stenosis. No central stenosis.  L5-S1: Disc osteophyte complex and bilateral facet hypertrophy. Moderate  bilateral neural foraminal stenosis. Mild central and bilateral subarticular  recess stenosis.     Visualized soft tissues: Normal.     --  IMPRESSION:  1. 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 There  is abutment of the ventral aspect of the conus medullaris at the L1-L2 level.  2. A right subarticular to foraminal disc extrusion at L4-L5 impinges/compresses  the exiting right L4 nerve roots.         Images were personally reviewed by myself and with the patient.      Assessment and Plan:    In summary, Uche Oswald is a 73 y.o. male with persistent escalating features of lumbosacral, right lower  extremity pain, dysfunction.  On neurologic examination he has features of right sided hip flexor, foot dorsiflexion weakness.  His symptoms correlate with L4, L5 nerve root dysfunction.  He has concordant radiographic findings of multilevel degenerative spondylosis, stenosis most significant between L1-L2, L2-L3, L3-L4, L4-L5.  At L4-L5 there is a foraminal disc herniation compressing on the exiting L4 nerve root.  We have asked that he obtain an EMG study of the lower extremities to gauge the severity of his radiculopathy.  Should there be active denervation occurring, surgical decompression would be advocated.    He was counseled at length regarding natural history of his condition.  I look forward to reassessing his progress on December 3, 2024 at 11 AM.  He will proceed with lifestyle modification, avoidance of excessive bending, twisting, overhead lifting, unrestrained forces across his axial skeleton.  In the interim should his symptomatology evolve or worsen, I have asked he return sooner for prompt reevaluation.    Thank you for referring Uche Derrick Oswald  to my attention.  Please feel free to contact me anytime if I can be of further assistance.        IJanet PA-C, am scribing for, and in the presence of, Dr. Kyler Houser.    I, Dr. Kyler Houser personally performed the services described in this documentation as scribed by Janet Bergman PA-C in my presence, and it is accurate and complete.    I spent 60 minutes on this date of service performing the following activities: obtaining history, performing examination, entering orders, documenting, preparing for visit, obtaining / reviewing records, providing counseling and education, independently reviewing study/studies, communicating results and coordinating care.

## 2024-11-08 NOTE — LETTER
2024     Steve Ash MD  [address]  [facility] [address]    Patient: Uche Oswald  YOB: 1950  Date of Visit: 2024      Dear Dr. Ash:    Thank you for referring Uche Oswald to me for evaluation. Below are my notes for this consultation.    If you have questions, please do not hesitate to call me. I look forward to following your patient along with you.         Sincerely,        Kyler Houser MD        CC: Kyler Mckinley MD  2024  3:46 PM  Sign when Signing Visit      [facility]  [facility]  Medical Office [address], [address]  [place] [address]  Phone: [phone]  Fax: [phone]        24      Re: Uche Oswald  : 1950      Chief Complaint:  Lower extremity radiculopathy     History of Present Illness:   Uche Oswald is a 73 y.o. right handed male who presents in neurosurgical consultation.  He has a longstanding history of lumbosacral, lower extremity discomfort.  There is no specific inciting event or circumstance.  He has been following with Dr. Sherwin Ash of pain management for the past 2 years.  In 2023 he noted new features of right lower extremity pain.  He underwent an epidural steroid injection which helped significantly reduce his discomfort.  His symptoms fluctuated thereafter and responded symptomatically to additional treatments.  Unfortunately he was diagnosed with a left-sided renal mass and underwent nephrectomy.  After this procedure his pain resolved.  He had a Minuteman procedure planned prior to his oncologic diagnosis.  He proceeded with this intervention after his nephrectomy.  His symptoms of right lower extremity pain returned thereafter.    While playing table tennis he suffered a significant fall landing on his bottom.  He noted greater aggravation of pain extending from his lumbosacral region into the gluteal,  right posterolateral thigh, calf, foot.  Imaging studies demonstrated concern for a right-sided L4-L5 foraminal disc herniation as well as chronic high-grade central, lateral recess stenosis at L1-L2, L2-L3, L3-L4.  He consulted with Dr. Richardson of orthopedic surgery who recommended a multilevel laminectomy procedure.  He now presents in secondary consultation regarding surgical intervention.    On interview today, he describes significant pain extending from the gluteal region, thigh, shin, foot.  His pain vacillates in severity between a 2-10 out of 10.  Postural activities magnifies symptoms, whereas rest in the recumbent position helps to a degree.  He denies additional features of livan numbness, weakness, bowel, bladder dysfunction.  He requires a cane to assist with ambulation.  He reports a severe level of impairment in his activities of daily living secondary to the aforementioned issues.    Medical History:  has a past medical history of Hyperthyroidism and Lipid disorder.    Surgical History:  has a past surgical history that includes Lumbar fusion and Tonsillectomy.    Family History: family history includes Kidney cancer in his biological father.  Family history non-contributory to neurological condition.    Social History:   Social History     Socioeconomic History   • Marital status:    Tobacco Use   • Smoking status: Never   • Smokeless tobacco: Never   Substance and Sexual Activity   • Alcohol use: Yes   • Drug use: Never   • Sexual activity: Yes        Allergies:   Allergies   Allergen Reactions   • Statins-Hmg-Coa Reductase Inhibitors Other (see comments)     Headaches and muscle aches        Medications:   Current Outpatient Medications   Medication Sig Dispense Refill   • acetaminophen (TYLENOL) 500 mg tablet Take 1,000 mg by mouth every 6 (six) hours as needed for pain.     • anastrozole (ARIMIDEX) 1 mg tablet TAKE 1/2 TABLET BY MOUTH EVERY SUNDAY.     • arginine HCl, L-arginine, 500 mg  capsule      • cyclobenzaprine (FLEXERIL) 5 mg tablet Take 1 tablet by mouth 3 (three) times a day as needed.     • ergocalciferol (VITAMIN D2) 50,000 unit(1250 mcg) capsule Take 1 capsule by mouth once a week.     • FREESTYLE RENNY 3 SENSOR device device APPLY NEW SENSOR EVERY 14 DAYS     • gabapentin (NEURONTIN) 300 mg capsule TAKE ONE CAPSULE BY MOUTH AT BEDTIME, AFTER 1 WEEK INCREASE TO TAKING ONE CAPSULE BY MOUTH EVERY MORNING AND EVERY EVENING     • HYDROcodone-acetaminophen (NORCO) 5-325 mg per tablet TAKE ONE TO TWO TABLETS BY MOUTH THREE TIMES DAILY AS NEEDED.     • ibuprofen (MOTRIN) 400 mg tablet Take 400 mg by mouth every 4 (four) hours.     • levothyroxine (SYNTHROID) 125 mcg tablet Take 1 tablet by mouth daily.     • liothyronine (CYTOMEL) 5 mcg tablet Take 5 mcg by mouth.     • methylPREDNISolone (MEDROL DOSEPACK) 4 mg tablet Follow package directions. 21 tablet 0   • semaglutide (OZEMPIC) 0.25 mg or 0.5 mg(2 mg/1.5 mL) subcutaneous injection      • sildenafiL (VIAGRA) 50 mg tablet Take 50 mg by mouth daily.     • simvastatin (ZOCOR) 10 mg tablet      • tadalafiL (CIALIS) 2.5 mg tablet Take 5 mg by mouth daily.     • tamsulosin (FLOMAX) 0.4 mg capsule      • testosterone enanthate (DELATESTRYL) 200 mg/mL injection 1 mL Intramuscular     • traMADoL (ULTRAM) 50 mg tablet TAKE ONE TABLET BY MOUTH EVERY 4 TO 6 HOURS AS NEEDED FOR 3 DAYS       No current facility-administered medications for this visit.       Review of Systems:   A 14 point review of systems was performed and aside from what is mentioned above is otherwise negative.    General physical examination:  The patient is well appearing male, sitting upright in his chair in no acute distress, he appears his stated age.  His head is atraumatic, normocephalic. His neck is supple without obvious adenopathy. Oral mucosa is moist. His peripheral pulses are symmetric and palpable. Extremities without peripheral edema. His breathing is normal and  unlabored.     Vitals:    11/08/24 1336   BP: 136/78   BP Location: Right upper arm   Patient Position: Sitting   Pulse: 79   Resp: 20   SpO2: 97%          Neurologic examination:  Mental status:  The patient is alert, attentive, and oriented. Speech is clear and fluent with good repetition, comprehension, and naming.  Remote and recent memory are normal as well as fund of knowledge.    Cranial nerves:  CN II: Visual fields are full to confrontation.  Pupils are equal and briskly reactive to light.   CN III, IV, VI: Extra-occular muscles are intact  CN V: Facial sensation is intact and equal in V1-V3 distributions bilaterally.   CN VII: Face is symmetric with normal eye closure and smile.  CN VIII: Hearing is normal to rubbing fingers  CN IX, X: Palate elevates symmetrically. Phonation is normal.  CN XI: Head turning and shoulder shrug are intact  CN XII: Tongue is midline with normal movements and no atrophy.    Motor:  There is no pronator drift.  Muscle bulk and tone are normal.    Deltoid Biceps Triceps Wrist ext Hand  Finger Spread Hip flexion Knee ext Dorsi-  flexion EHL Plantar Flexion   L 5 5 5 5 5 5 4+ PL 5 5 5 5   R 5 5 5 5 5 5 4 PL 5 4 4 5     Reflexes:  Reflexes are 2+ and symmetric at the biceps, brachialis, triceps, patellar, and Achilli's. There is no Marc's sign or ankle clonus.    Sensory:   Intact light touch, pinprick, and position sense, throughout all 4 extremities.    Coordination:  There is no dysmetria on finger-to-nose testing.  Romberg is absent.    Gait:  Wider based gait but steady. Heel and toe walking are unsteady. Tandem gait is unsteady.      Data Review:  MRI LUMBAR SPINE WITHOUT CONTRAST 9/11/24      MRI LUMBAR SPINE WITHOUT CONTRAST 8/19/24        MRI LUMBAR SPINE WITHOUT CONTRAST 10/10/23    Narrative & Impression   CLINICAL HISTORY: Spinal stenosis, lumbar region without neurogenic claudication  [M48.061 (ICD-10-CM)]     COMMENT: MRI examination of the lumbar spine was  performed without intravenous  contrast following the [facility] standard protocol.     COMPARISON: None.     Lumbosacral alignment: Normal lordosis. Mild retrolisthesis of L2 on L3 on the  basis of facet arthropathy.  Vertebral bodies: Normal in height, noting endplate irregularity and small  Schmorl's nodes at multiple levels. Focal hemangioma within L2.  Intervertebral discs: Multilevel loss of intervertebral disc height and  signal, appearing moderate to severe at L2-L3 and L5-S1 and moderate at L1-L2,  L3-L4 and L4-L5.     Lumbosacral central spinal canal: Multilevel acquired compromise superimposed  on diffuse congenital narrowing.  Conus: Normal in position, caliber, and signal. See impression.     Axial images:  T12-L1: Facet hypertrophy. No central or neural foraminal stenosis.  L1-2: Disc osteophyte complex, facet hypertrophy and infolded ligamentum  flavum. Mild bilateral neural foraminal stenosis. Mild central and left greater  than right subarticular recess stenosis.  L2-3: Disc osteophyte complex with a superimposed left subarticular disc  protrusion, facet hypertrophy and infolded ligamentum flavum. Mild bilateral  neural foraminal stenosis. Mild central and left greater than right subarticular  recess stenosis.  L3-4: Disc osteophyte complex and bilateral facet hypertrophy. Moderate  bilateral neural foraminal stenosis. Mild central and bilateral subarticular  recess stenosis.  L4-5: Disc osteophyte complex eccentric dorsolateral to the right with a  superimposed right subarticular to foraminal foraminal disc herniation of the  extrusion type. Bilateral facet hypertrophy and infolded ligamentum flavum.  Severe right and moderate left neural foraminal stenosis. No central stenosis.  L5-S1: Disc osteophyte complex and bilateral facet hypertrophy. Moderate  bilateral neural foraminal stenosis. Mild central and bilateral subarticular  recess stenosis.     Visualized soft tissues: Normal.      --  IMPRESSION:  1. 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 There  is abutment of the ventral aspect of the conus medullaris at the L1-L2 level.  2. A right subarticular to foraminal disc extrusion at L4-L5 impinges/compresses  the exiting right L4 nerve roots.         Images were personally reviewed by myself and with the patient.      Assessment and Plan:    In summary, Uche Oswald is a 73 y.o. male with persistent escalating features of lumbosacral, right lower extremity pain, dysfunction.  On neurologic examination he has features of right sided hip flexor, foot dorsiflexion weakness.  His symptoms correlate with L4, L5 nerve root dysfunction.  He has concordant radiographic findings of multilevel degenerative spondylosis, stenosis most significant between L1-L2, L2-L3, L3-L4, L4-L5.  At L4-L5 there is a foraminal disc herniation compressing on the exiting L4 nerve root.  We have asked that he obtain an EMG study of the lower extremities to gauge the severity of his radiculopathy.  Should there be active denervation occurring, surgical decompression would be advocated.    He was counseled at length regarding natural history of his condition.  I look forward to reassessing his progress on December 3, 2024 at 11 AM.  He will proceed with lifestyle modification, avoidance of excessive bending, twisting, overhead lifting, unrestrained forces across his axial skeleton.  In the interim should his symptomatology evolve or worsen, I have asked he return sooner for prompt reevaluation.    Thank you for referring Uche Oswald  to my attention.  Please feel free to contact me anytime if I can be of further assistance.        Janet HORNE PA-C, am scribing for, and in the presence of, Dr. Kyler Houser.    I, Dr. Kyler Houser personally performed the services described in this documentation as  scribed by Janet Bergman PA-C in my presence, and it is accurate and complete.    I spent 60 minutes on this date of service performing the following activities: obtaining history, performing examination, entering orders, documenting, preparing for visit, obtaining / reviewing records, providing counseling and education, independently reviewing study/studies, communicating results and coordinating care.